# Patient Record
Sex: MALE | Race: WHITE | NOT HISPANIC OR LATINO | URBAN - METROPOLITAN AREA
[De-identification: names, ages, dates, MRNs, and addresses within clinical notes are randomized per-mention and may not be internally consistent; named-entity substitution may affect disease eponyms.]

---

## 2023-07-15 ENCOUNTER — OFFICE VISIT (OUTPATIENT)
Dept: URGENT CARE | Facility: CLINIC | Age: 38
End: 2023-07-15
Payer: COMMERCIAL

## 2023-07-15 VITALS
OXYGEN SATURATION: 99 % | SYSTOLIC BLOOD PRESSURE: 150 MMHG | RESPIRATION RATE: 18 BRPM | HEART RATE: 84 BPM | TEMPERATURE: 98 F | WEIGHT: 185 LBS | BODY MASS INDEX: 25.06 KG/M2 | HEIGHT: 72 IN | DIASTOLIC BLOOD PRESSURE: 92 MMHG
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
## 2023-07-15 DIAGNOSIS — S10.16XA INSECT BITE OF THROAT, INITIAL ENCOUNTER: Primary | ICD-10-CM

## 2023-07-15 PROCEDURE — 96372 PR INJECTION,THERAP/PROPH/DIAG2ST, IM OR SUBCUT: ICD-10-PCS | Mod: ,,,

## 2023-07-15 PROCEDURE — 99203 OFFICE O/P NEW LOW 30 MIN: CPT | Mod: 25,S$GLB,,

## 2023-07-15 PROCEDURE — 96372 THER/PROPH/DIAG INJ SC/IM: CPT | Mod: ,,,

## 2023-07-15 PROCEDURE — 99203 PR OFFICE/OUTPT VISIT, NEW, LEVL III, 30-44 MIN: ICD-10-PCS | Mod: 25,S$GLB,,

## 2023-07-15 RX ORDER — PREDNISONE 20 MG/1
60 TABLET ORAL
Status: COMPLETED | OUTPATIENT
Start: 2023-07-15 | End: 2023-07-15

## 2023-07-15 RX ADMIN — PREDNISONE 60 MG: 20 TABLET ORAL at 02:07

## 2023-07-15 NOTE — PROGRESS NOTES
Subjective:      Patient ID: Mcihael Blanc is a 38 y.o. male.    Vitals:  height is 6' (1.829 m) and weight is 83.9 kg (185 lb). His oral temperature is 97.9 °F (36.6 °C). His blood pressure is 150/92 (abnormal) and his pulse is 84. His respiration is 18 and oxygen saturation is 99%.     Chief Complaint: Insect Bite    Pt presents today after swallowing a yellow jacket x's today. Pt says after swallowing the insect he was stung. Pt is complaining of pain of throat. Pt says it hurts to swallow. Pt has taken two ibuprofen and two benadryl for symptoms with no relief. PT denies SOB, PC, SOB with exertion or throat swelling,.    Insect Bite  This is a new problem. The current episode started today. The problem occurs constantly. The problem has been unchanged. Nothing aggravates the symptoms. He has tried acetaminophen and NSAIDs for the symptoms. The treatment provided no relief.   ROS   Objective:     Physical Exam   Constitutional: He is oriented to person, place, and time. He appears well-developed. He is cooperative.  Non-toxic appearance. He does not appear ill. No distress.   HENT:   Head: Normocephalic and atraumatic.   Ears:   Right Ear: Hearing normal.   Left Ear: Hearing normal.   Nose: Mucosal edema present. No rhinorrhea, purulent discharge, nasal deformity or congestion. No epistaxis. Right sinus exhibits no maxillary sinus tenderness and no frontal sinus tenderness. Left sinus exhibits no maxillary sinus tenderness and no frontal sinus tenderness.   Mouth/Throat: Uvula is midline and mucous membranes are normal. Mucous membranes are moist. No trismus in the jaw. Normal dentition. No uvula swelling. Posterior oropharyngeal erythema present. No oropharyngeal exudate, posterior oropharyngeal edema, tonsillar abscesses or cobblestoning. Tonsils are 1+ on the right. Tonsils are 1+ on the left.   No Trismus, or anny's, PT tolerating secretions, able to visualize uvula.    Post Oropharyngeal is red, no swelling  noted, prior to examination patient drank 8 oz of water      Comments: No Trismus, or anny's, PT tolerating secretions, able to visualize uvula.    Post Oropharyngeal is red, no swelling noted, prior to examination patient drank 8 oz of water  Eyes: Conjunctivae and lids are normal. No scleral icterus.   Neck: Trachea normal and phonation normal. Neck supple. No crepitus.      Comments: Neck and trachea was nontender on examination. No edema present. No erythema present. No neck rigidity present. No decreased range of motion present. No pain with movement present. No spinous process tenderness present. No muscular tenderness present.   Cardiovascular: Normal rate, regular rhythm, S1 normal, S2 normal, normal heart sounds and normal pulses.   Pulmonary/Chest: Effort normal and breath sounds normal. No accessory muscle usage or stridor. No apnea, no tachypnea and no bradypnea. No respiratory distress. He has no decreased breath sounds. He has no wheezes. He has no rhonchi. He has no rales.   Abdominal: Normal appearance.   Musculoskeletal: Normal range of motion.         General: No deformity. Normal range of motion.   Neurological: He is alert and oriented to person, place, and time. He exhibits normal muscle tone. Coordination normal.   Skin: Skin is warm, dry, intact, not diaphoretic and not pale.   Psychiatric: His speech is normal and behavior is normal. Judgment and thought content normal.   Nursing note and vitals reviewed.    Assessment:     1. Insect bite of throat, initial encounter        Plan:       Insect bite of throat, initial encounter  -     predniSONE tablet 60 mg          Medical Decision Making:   Initial Assessment:   PT in room AAOX4, skin W/D, resp E/U, non toxic in appearance, NAD.  All lung fields clear to auscultation No Trismus, or anny's, PT tolerating secretions, able to visualize uvula.   No drooling PT speaking in clear sentences.  Oropharyngeal is slightly red  Urgent Care  Management:  Vital signs stable nontoxic in appearance patient is tolerating secretions and denies any sensation of throat closing furthermore exam of patient's throat did not indicate any throat closing. Discussed If condition worsens or fails to improve we recommend that you receive another evaluation at the ER immediately or contact your PCP to discuss your concerns or return here. Discussed  To increase fluids and rest are important, take over the counter Pepcid as directed for the next 3-5 days as directed by medication label.Discussed to take Claritin or Zyrtec or Allegra (24 hours) daily for the next 3-5 days and can take Benadryl as needed for itching.  Patient was given prednisone in clinic.  Also discussed with patient to take Tylenol or Motrin as needed for pain Reviewed if PT develops additional symptoms such as tongue swelling or trouble breathing or any other symptoms, or worsening symptoms go immediately to the ER. PT agreed with treatment, and plan. PT verbalized understanding NAD.      Other:   I have discussed this case with another health care provider.       <> Summary of the Discussion: Discussed treatment with Dr. Patel and agrees to steroid in clinic and antihistamine treatment at home, with ED precautions.

## 2023-07-15 NOTE — PATIENT INSTRUCTIONS
Insect Bite   Please return here or go to the Emergency Department for any concerns or worsening of condition.   Over the Counter Claritin or Zyrtec as directed daily for the next 3-5 days. You can take Benadryl at night if needed  Also take Pepcid daily as directed for the next 3-5 days.   Cool Compresses to the affected area.   Follow up with your PCP in the next 2-3 days if no improvement   If you develop an increase in redness, swelling, tenderness, drainage, fever, nausea/vomiting, ect., go to the ER.